# Patient Record
Sex: MALE | Race: OTHER | HISPANIC OR LATINO | Employment: UNEMPLOYED | URBAN - METROPOLITAN AREA
[De-identification: names, ages, dates, MRNs, and addresses within clinical notes are randomized per-mention and may not be internally consistent; named-entity substitution may affect disease eponyms.]

---

## 2022-02-28 ENCOUNTER — TELEPHONE (OUTPATIENT)
Dept: SCHEDULING | Facility: IMAGING CENTER | Age: 52
End: 2022-02-28

## 2022-03-01 ENCOUNTER — OFFICE VISIT (OUTPATIENT)
Dept: MEDICAL GROUP | Facility: IMAGING CENTER | Age: 52
End: 2022-03-01
Payer: COMMERCIAL

## 2022-03-01 VITALS
TEMPERATURE: 97.6 F | OXYGEN SATURATION: 96 % | SYSTOLIC BLOOD PRESSURE: 108 MMHG | WEIGHT: 309 LBS | HEART RATE: 104 BPM | RESPIRATION RATE: 12 BRPM | BODY MASS INDEX: 44.24 KG/M2 | HEIGHT: 70 IN | DIASTOLIC BLOOD PRESSURE: 78 MMHG

## 2022-03-01 DIAGNOSIS — I10 ESSENTIAL HYPERTENSION: ICD-10-CM

## 2022-03-01 DIAGNOSIS — F33.41 RECURRENT MAJOR DEPRESSIVE DISORDER, IN PARTIAL REMISSION (HCC): ICD-10-CM

## 2022-03-01 DIAGNOSIS — E78.00 PURE HYPERCHOLESTEROLEMIA: ICD-10-CM

## 2022-03-01 DIAGNOSIS — Z76.89 ENCOUNTER TO ESTABLISH CARE: ICD-10-CM

## 2022-03-01 DIAGNOSIS — E66.01 MORBID OBESITY (HCC): ICD-10-CM

## 2022-03-01 DIAGNOSIS — Z12.5 PROSTATE CANCER SCREENING: ICD-10-CM

## 2022-03-01 DIAGNOSIS — G25.81 RESTLESS LEGS SYNDROME: ICD-10-CM

## 2022-03-01 DIAGNOSIS — Z98.84 PERSONAL HX OF GASTRIC BYPASS: ICD-10-CM

## 2022-03-01 DIAGNOSIS — Z23 NEED FOR VACCINATION: ICD-10-CM

## 2022-03-01 DIAGNOSIS — K21.9 GASTROESOPHAGEAL REFLUX DISEASE WITHOUT ESOPHAGITIS: ICD-10-CM

## 2022-03-01 DIAGNOSIS — R73.02 IGT (IMPAIRED GLUCOSE TOLERANCE): ICD-10-CM

## 2022-03-01 DIAGNOSIS — L65.9 HAIR LOSS: ICD-10-CM

## 2022-03-01 PROBLEM — K75.81 STEATOHEPATITIS: Status: ACTIVE | Noted: 2021-05-17

## 2022-03-01 PROBLEM — K90.829 POSTRESECTIONAL MALABSORPTION SYNDROME: Status: ACTIVE | Noted: 2021-05-17

## 2022-03-01 PROBLEM — M54.50 LOW BACK PAIN: Status: ACTIVE | Noted: 2021-05-17

## 2022-03-01 PROBLEM — E78.5 HYPERLIPIDEMIA: Status: ACTIVE | Noted: 2021-05-17

## 2022-03-01 PROBLEM — F32.A DEPRESSION: Status: ACTIVE | Noted: 2021-05-17

## 2022-03-01 PROBLEM — M19.90 ARTHRITIS: Status: ACTIVE | Noted: 2021-05-17

## 2022-03-01 PROBLEM — Z86.010 HISTORY OF COLON POLYPS: Status: ACTIVE | Noted: 2021-10-11

## 2022-03-01 PROCEDURE — 99204 OFFICE O/P NEW MOD 45 MIN: CPT | Mod: 25 | Performed by: PHYSICIAN ASSISTANT

## 2022-03-01 PROCEDURE — 90471 IMMUNIZATION ADMIN: CPT | Performed by: PHYSICIAN ASSISTANT

## 2022-03-01 PROCEDURE — 90715 TDAP VACCINE 7 YRS/> IM: CPT | Performed by: PHYSICIAN ASSISTANT

## 2022-03-01 RX ORDER — PANTOPRAZOLE SODIUM 40 MG/1
40 TABLET, DELAYED RELEASE ORAL DAILY
COMMUNITY
Start: 2022-01-23 | End: 2022-07-08 | Stop reason: SDUPTHER

## 2022-03-01 RX ORDER — LAMOTRIGINE 25 MG/1
TABLET ORAL
Qty: 30 TABLET | Refills: 0 | Status: SHIPPED | OUTPATIENT
Start: 2022-03-01 | End: 2022-03-10

## 2022-03-01 RX ORDER — ARIPIPRAZOLE 2 MG/1
TABLET ORAL
COMMUNITY
Start: 2022-01-23 | End: 2022-03-01

## 2022-03-01 RX ORDER — PHENTERMINE HYDROCHLORIDE 37.5 MG/1
TABLET ORAL
COMMUNITY
Start: 2022-02-16 | End: 2022-10-04

## 2022-03-01 RX ORDER — ALPRAZOLAM 0.5 MG/1
0.5 TABLET ORAL PRN
COMMUNITY

## 2022-03-01 RX ORDER — OLMESARTAN MEDOXOMIL AND HYDROCHLOROTHIAZIDE 20/12.5 20; 12.5 MG/1; MG/1
1 TABLET ORAL
COMMUNITY
Start: 2021-12-10 | End: 2022-03-04

## 2022-03-01 RX ORDER — SIMVASTATIN 40 MG
TABLET ORAL
COMMUNITY
Start: 2022-01-04 | End: 2022-06-30 | Stop reason: SDUPTHER

## 2022-03-01 RX ORDER — FINASTERIDE 1 MG/1
1 TABLET, FILM COATED ORAL DAILY
Qty: 90 TABLET | Refills: 1 | Status: SHIPPED | OUTPATIENT
Start: 2022-03-01 | End: 2022-03-01

## 2022-03-01 ASSESSMENT — PAIN SCALES - GENERAL: PAINLEVEL: NO PAIN

## 2022-03-01 ASSESSMENT — ANXIETY QUESTIONNAIRES
1. FEELING NERVOUS, ANXIOUS, OR ON EDGE: SEVERAL DAYS
6. BECOMING EASILY ANNOYED OR IRRITABLE: SEVERAL DAYS
5. BEING SO RESTLESS THAT IT IS HARD TO SIT STILL: SEVERAL DAYS
2. NOT BEING ABLE TO STOP OR CONTROL WORRYING: NOT AT ALL
4. TROUBLE RELAXING: NOT AT ALL
7. FEELING AFRAID AS IF SOMETHING AWFUL MIGHT HAPPEN: NOT AT ALL
3. WORRYING TOO MUCH ABOUT DIFFERENT THINGS: NOT AT ALL
GAD7 TOTAL SCORE: 3

## 2022-03-01 ASSESSMENT — PATIENT HEALTH QUESTIONNAIRE - PHQ9
CLINICAL INTERPRETATION OF PHQ2 SCORE: 2
SUM OF ALL RESPONSES TO PHQ QUESTIONS 1-9: 13
5. POOR APPETITE OR OVEREATING: 3 - NEARLY EVERY DAY

## 2022-03-01 NOTE — ASSESSMENT & PLAN NOTE
Last A1c 5.8.  Patient states that he has been working on weight loss.  He is now on phentermine.  Tolerating well.

## 2022-03-01 NOTE — ASSESSMENT & PLAN NOTE
Patient admits to history of restless legs.  He states it  is intermittent and happens at night.  He takes gabapentin as needed.

## 2022-03-01 NOTE — ASSESSMENT & PLAN NOTE
Patient admits to hair loss on his scalp.  He is requesting a medication to help with hair growth.

## 2022-03-01 NOTE — ASSESSMENT & PLAN NOTE
Chronic, on sertraline 50 mg daily and Abilify 2 mg daily.  Patient wishes to try to go off of the Abilify due to concerns for weight gain.  He states he has been on that for several years.  He states he has been on other antidepressants in the past including Wellbutrin and Lexapro.  He states that he does have problems with impulsive behavior most notably related to eating and shopping.  His sister is an alcoholic.  No known diagnosis of bipolar disorder.

## 2022-03-01 NOTE — PROGRESS NOTES
Subjective:     CC:   Chief Complaint   Patient presents with   • Establish Care       HPI:   Chan presents today to discuss:    Depression  Chronic, on sertraline 50 mg daily and Abilify 2 mg daily.  Patient wishes to try to go off of the Abilify due to concerns for weight gain.  He states he has been on that for several years.  He states he has been on other antidepressants in the past including Wellbutrin and Lexapro.  He states that he does have problems with impulsive behavior most notably related to eating and shopping.  His sister is an alcoholic.  No known diagnosis of bipolar disorder.    Essential hypertension  Chronic, controlled on Benicar HCT.  Previous labs reviewed.    Gastroesophageal reflux disease  Chronic, controlled on Protonix 40 mg daily.  Last endoscopy in August 2021.    Hair loss  Patient admits to hair loss on his scalp.  He is requesting a medication to help with hair growth.    Hyperlipidemia  Chronic, controlled on simvastatin 40 mg daily.  Previous labs reviewed.    IGT (impaired glucose tolerance)  Last A1c 5.8.  Patient states that he has been working on weight loss.  He is now on phentermine.  Tolerating well.    Morbid obesity (HCC)  With history of gastric bypass.  Weight is being managed by Dr. Lock.  Currently on phentermine.    Restless legs syndrome  Patient admits to history of restless legs.  He states it  is intermittent and happens at night.  He takes gabapentin as needed.      History reviewed. No pertinent past medical history.  Social History     Tobacco Use   • Smoking status: Never Smoker   • Smokeless tobacco: Never Used   Vaping Use   • Vaping Use: Never used   Substance Use Topics   • Alcohol use: Yes     Comment: rare   • Drug use: Never     Current Outpatient Medications Ordered in Epic   Medication Sig Dispense Refill   • phentermine (ADIPEX-P) 37.5 MG tablet TAKE 1 TABLET (37.5 MG TOTAL) BY MOUTH DAILY BEFORE BREAKFAST. MAX DAILY AMOUNT: 37.5 MG     •  "simvastatin (ZOCOR) 40 MG Tab TAKE 1 TABLET ORAL DAILY FOR 90 DAYS.     • pantoprazole (PROTONIX) 40 MG Tablet Delayed Response Take 40 mg by mouth every day.     • olmesartan-hydrochlorothiazide (BENICAR HCT) 20-12.5 MG per tablet Take 1 Tablet by mouth every day.     • sertraline (ZOLOFT) 50 MG Tab TAKE 1 TABLET ORAL DAILY FOR 90 DAYS.     • Multiple Vitamin (MULTI-VITAMIN) Tab Take 1 Tablet by mouth every day.     • Calcium Citrate-Vitamin D 315-250 MG-UNIT Tab Take 1 Tablet by mouth 2 times a day.     • ALPRAZolam (XANAX) 0.5 MG Tab Take 0.5 mg by mouth as needed for Sleep. For anxiety     • GABAPENTIN PO Take 200 mg by mouth as needed. For restless leg syndrome     • lamoTRIgine (LAMICTAL) 25 MG Tab Take 1 tab daily x 1 week, then increase to 2 tabs daily 30 Tablet 0   • finasteride (PROPECIA) 1 MG tablet Take 1 Tablet by mouth every day. 90 Tablet 1     No current Epic-ordered facility-administered medications on file.     Patient has no known allergies.    Health Maintenance: Completed    ROS: see hpi  Gen: no fevers/chills  Pulm: no sob, no cough  CV: no chest pain, no palpitations, no edema  GI: no nausea/vomiting, no diarrhea  Skin: no rash    Objective:   Exam:  /78   Pulse (!) 104   Temp 36.4 °C (97.6 °F)   Resp 12   Ht 1.778 m (5' 10\")   Wt (!) 140 kg (309 lb)   SpO2 96%   BMI 44.34 kg/m²    Body mass index is 44.34 kg/m².    Gen: Alert and oriented, No apparent distress.  Thinning hair noted on the scalp.  HEENT: Head atraumatic, normocephalic. Pupils equal and round.  Neck: Neck is supple without lymphadenopathy.   Lungs: Normal effort, CTA bilaterally, no wheezes, rhonchi, or rales  CV: Regular rate and rhythm. No murmurs, rubs, or gallops.  Ext: No clubbing, cyanosis, edema.    Assessment & Plan:     51 y.o. male with the following -     1. Encounter to establish care  Pleasant 51-year-old male here to establish care.  History reviewed.  Previous records reviewed with patient.  " Endoscopy up-to-date.  Due for PSA.    2. Recurrent major depressive disorder, in partial remission (HCC)  Chronic, uncontrolled.  Will wean off Abilify.  Take every other day for a week then stop.  We will have patient start Lamictal 25 mg daily for this first week and then increase to 2 tablets daily.  Recheck in 3 weeks.  Continue Zoloft at this time.   - lamoTRIgine (LAMICTAL) 25 MG Tab; Take 1 tab daily x 1 week, then increase to 2 tabs daily  Dispense: 30 Tablet; Refill: 0    3. IGT (impaired glucose tolerance)  Please continue working on lifestyle modifications. This includes accumulation of 150 minutes or more of moderate-intensity activity each week as tolerated and a healthy diet that is low in saturated fat, sodium, and cholesterol, such as the mediterranean diet that is typically high in fruits, vegetables, whole grains, beans, nuts, and seeds, includes olive oil as an important source of monounsaturated fat, DASH diet, and/or also consider a plant-based diet.    - HEMOGLOBIN A1C; Future    4. Essential hypertension  Chronic, controlled.  Continue current regimen.  - Comp Metabolic Panel; Future  - URIC ACID; Future    5. Hair loss  - finasteride (PROPECIA) 1 MG tablet; Take 1 Tablet by mouth every day.  Dispense: 90 Tablet; Refill: 1    6. Restless legs syndrome  - Comp Metabolic Panel; Future  - URIC ACID; Future    7. Personal hx of gastric bypass    8. Gastroesophageal reflux disease without esophagitis  Chronic, controlled on Protonix    9. Pure hypercholesterolemia  Chronic, controlled on simvastatin    10. Morbid obesity (HCC)  Status post gastric bypass.  Managed by weight specialist.    11. Prostate cancer screening  - PROSTATE SPECIFIC AG SCREENING; Future    12. Need for vaccination  - Tdap Vaccine =>8YO IM    Return in about 3 weeks (around 3/22/2022) for Annual physical, lab review.    Roro Munson PA-C ([facility])  Physician Assistant Certified  [facility]    Please note that this  dictation was created using voice recognition software. I have made every reasonable attempt to correct obvious errors, but I expect that there are errors of grammar and possibly content that I did not discover before finalizing the note.

## 2022-03-04 ENCOUNTER — HOSPITAL ENCOUNTER (OUTPATIENT)
Dept: LAB | Facility: MEDICAL CENTER | Age: 52
End: 2022-03-04
Attending: PHYSICIAN ASSISTANT
Payer: COMMERCIAL

## 2022-03-04 DIAGNOSIS — R73.02 IGT (IMPAIRED GLUCOSE TOLERANCE): ICD-10-CM

## 2022-03-04 DIAGNOSIS — I10 ESSENTIAL HYPERTENSION: ICD-10-CM

## 2022-03-04 DIAGNOSIS — G25.81 RESTLESS LEGS SYNDROME: ICD-10-CM

## 2022-03-04 DIAGNOSIS — E79.0 ELEVATED URIC ACID IN BLOOD: ICD-10-CM

## 2022-03-04 DIAGNOSIS — Z12.5 PROSTATE CANCER SCREENING: ICD-10-CM

## 2022-03-04 LAB
ALBUMIN SERPL BCP-MCNC: 4.3 G/DL (ref 3.2–4.9)
ALBUMIN/GLOB SERPL: 1.5 G/DL
ALP SERPL-CCNC: 84 U/L (ref 30–99)
ALT SERPL-CCNC: 43 U/L (ref 2–50)
ANION GAP SERPL CALC-SCNC: 13 MMOL/L (ref 7–16)
AST SERPL-CCNC: 34 U/L (ref 12–45)
BILIRUB SERPL-MCNC: 0.4 MG/DL (ref 0.1–1.5)
BUN SERPL-MCNC: 16 MG/DL (ref 8–22)
CALCIUM SERPL-MCNC: 10 MG/DL (ref 8.5–10.5)
CHLORIDE SERPL-SCNC: 101 MMOL/L (ref 96–112)
CO2 SERPL-SCNC: 23 MMOL/L (ref 20–33)
CREAT SERPL-MCNC: 0.95 MG/DL (ref 0.5–1.4)
EST. AVERAGE GLUCOSE BLD GHB EST-MCNC: 126 MG/DL
FASTING STATUS PATIENT QL REPORTED: NORMAL
GLOBULIN SER CALC-MCNC: 2.8 G/DL (ref 1.9–3.5)
GLUCOSE SERPL-MCNC: 102 MG/DL (ref 65–99)
HBA1C MFR BLD: 6 % (ref 4–5.6)
POTASSIUM SERPL-SCNC: 4 MMOL/L (ref 3.6–5.5)
PROT SERPL-MCNC: 7.1 G/DL (ref 6–8.2)
PSA SERPL-MCNC: 0.38 NG/ML (ref 0–4)
SODIUM SERPL-SCNC: 137 MMOL/L (ref 135–145)
URATE SERPL-MCNC: 7.5 MG/DL (ref 2.5–8.3)

## 2022-03-04 PROCEDURE — 80053 COMPREHEN METABOLIC PANEL: CPT

## 2022-03-04 PROCEDURE — 84550 ASSAY OF BLOOD/URIC ACID: CPT

## 2022-03-04 PROCEDURE — 36415 COLL VENOUS BLD VENIPUNCTURE: CPT

## 2022-03-04 PROCEDURE — 84153 ASSAY OF PSA TOTAL: CPT

## 2022-03-04 PROCEDURE — 83036 HEMOGLOBIN GLYCOSYLATED A1C: CPT

## 2022-03-04 RX ORDER — OLMESARTAN MEDOXOMIL 40 MG/1
40 TABLET ORAL DAILY
Qty: 30 TABLET | Refills: 0 | Status: SHIPPED | OUTPATIENT
Start: 2022-03-04 | End: 2022-03-28

## 2022-03-08 DIAGNOSIS — F33.41 RECURRENT MAJOR DEPRESSIVE DISORDER, IN PARTIAL REMISSION (HCC): ICD-10-CM

## 2022-03-10 RX ORDER — LAMOTRIGINE 25 MG/1
TABLET ORAL
Qty: 180 TABLET | Refills: 1 | Status: SHIPPED | OUTPATIENT
Start: 2022-03-10 | End: 2022-03-18

## 2022-03-18 ENCOUNTER — OFFICE VISIT (OUTPATIENT)
Dept: MEDICAL GROUP | Facility: IMAGING CENTER | Age: 52
End: 2022-03-18
Payer: COMMERCIAL

## 2022-03-18 VITALS
DIASTOLIC BLOOD PRESSURE: 66 MMHG | SYSTOLIC BLOOD PRESSURE: 106 MMHG | OXYGEN SATURATION: 97 % | BODY MASS INDEX: 43.52 KG/M2 | HEIGHT: 70 IN | TEMPERATURE: 97.4 F | HEART RATE: 101 BPM | WEIGHT: 304 LBS

## 2022-03-18 DIAGNOSIS — F33.41 RECURRENT MAJOR DEPRESSIVE DISORDER, IN PARTIAL REMISSION (HCC): ICD-10-CM

## 2022-03-18 DIAGNOSIS — E79.0 ELEVATED URIC ACID IN BLOOD: ICD-10-CM

## 2022-03-18 DIAGNOSIS — Z00.00 WELLNESS EXAMINATION: ICD-10-CM

## 2022-03-18 DIAGNOSIS — L65.9 HAIR LOSS: ICD-10-CM

## 2022-03-18 DIAGNOSIS — R73.02 IGT (IMPAIRED GLUCOSE TOLERANCE): ICD-10-CM

## 2022-03-18 DIAGNOSIS — E66.01 MORBID OBESITY (HCC): ICD-10-CM

## 2022-03-18 PROCEDURE — 99396 PREV VISIT EST AGE 40-64: CPT | Performed by: PHYSICIAN ASSISTANT

## 2022-03-18 RX ORDER — LAMOTRIGINE 25 MG/1
50 TABLET ORAL DAILY
Qty: 180 TABLET | Refills: 1
Start: 2022-03-18 | End: 2022-06-20 | Stop reason: SDUPTHER

## 2022-03-18 ASSESSMENT — ANXIETY QUESTIONNAIRES
6. BECOMING EASILY ANNOYED OR IRRITABLE: NOT AT ALL
5. BEING SO RESTLESS THAT IT IS HARD TO SIT STILL: NOT AT ALL
7. FEELING AFRAID AS IF SOMETHING AWFUL MIGHT HAPPEN: NOT AT ALL
3. WORRYING TOO MUCH ABOUT DIFFERENT THINGS: NOT AT ALL
1. FEELING NERVOUS, ANXIOUS, OR ON EDGE: NOT AT ALL
2. NOT BEING ABLE TO STOP OR CONTROL WORRYING: NOT AT ALL
4. TROUBLE RELAXING: NOT AT ALL
GAD7 TOTAL SCORE: 0

## 2022-03-18 ASSESSMENT — PAIN SCALES - GENERAL: PAINLEVEL: NO PAIN

## 2022-03-18 ASSESSMENT — PATIENT HEALTH QUESTIONNAIRE - PHQ9: CLINICAL INTERPRETATION OF PHQ2 SCORE: 0

## 2022-03-18 NOTE — PROGRESS NOTES
Subjective:     CC:   Chief Complaint   Patient presents with   • Annual Exam     With lab review       HPI:   Chan presents today to discuss:    Elevated uric acid in blood  Patient's labs show uric acid level 7.5.  Hydrochlorothiazide was removed and olmesartan was increased to 40 mg daily.  Tolerating olmesartan.  Patient denies any history of joint pain.  No history of gout flares.  Rarely drinks beer.  Rarely eats shellfish.    Depression  Patient states that he has noticed significant improvement since switching to Lamictal 50 mg daily.  He is now off Abilify.  He is still on Zoloft.  He feels like his anxiety and depression are very well controlled.    IGT (impaired glucose tolerance)  Patient's labs show slight increase of his A1c.  He is currently being managed by a weight specialist and is on phentermine.  He has lost 5 pounds since his last visit.    Morbid obesity (HCC)  Chronic, patient working on weight loss.    Hair loss  Patient states that since starting Propecia he has noticed new hair growth on his scalp.  He is very excited about his result so far.  He wishes to continue this medication.  No side effects.    Depression Screening    Little interest or pleasure in doing things?  0 - not at all  Feeling down, depressed , or hopeless? 0 - not at all  Patient Health Questionnaire Score: 0    CRISTA-7 Questionnaire    Feeling nervous, anxious, or on edge: Not at all  Not being able to sop or control worrying: Not at all  Worrying too much about different things: Not at all  Trouble relaxing: Not at all  Being so restless that it's hard to sit still: Not at all  Becoming easily annoyed or irritable: Not at all  Feeling afraid as if something awful might happen: Not at all  Total: 0    History reviewed. No pertinent past medical history.  Family History   Problem Relation Age of Onset   • Lupus Mother    • Kidney Disease Mother    • Stroke Mother    • Kidney Disease Father    • Hypertension Father    •  Lupus Sister    • Cancer Paternal Grandfather         liver cancer   • Lupus Sister    • Diabetes Neg Hx      Past Surgical History:   Procedure Laterality Date   • GASTRIC BYPASS LAPAROSCOPIC      2006     Social History     Tobacco Use   • Smoking status: Never Smoker   • Smokeless tobacco: Never Used   Vaping Use   • Vaping Use: Never used   Substance Use Topics   • Alcohol use: Yes     Comment: rare   • Drug use: Never     Social History     Social History Narrative    From [place]    Stay at home    2 cats and 1 dog    Has several craft hobbies    Several tattoos    No blood transfusions    No  work     Current Outpatient Medications Ordered in Epic   Medication Sig Dispense Refill   • lamoTRIgine (LAMICTAL) 25 MG Tab Take 2 Tablets by mouth every day. 180 Tablet 1   • olmesartan (BENICAR) 40 MG Tab Take 1 Tablet by mouth every day. 30 Tablet 0   • phentermine (ADIPEX-P) 37.5 MG tablet TAKE 1 TABLET (37.5 MG TOTAL) BY MOUTH DAILY BEFORE BREAKFAST. MAX DAILY AMOUNT: 37.5 MG     • simvastatin (ZOCOR) 40 MG Tab TAKE 1 TABLET ORAL DAILY FOR 90 DAYS.     • pantoprazole (PROTONIX) 40 MG Tablet Delayed Response Take 40 mg by mouth every day.     • sertraline (ZOLOFT) 50 MG Tab TAKE 1 TABLET ORAL DAILY FOR 90 DAYS.     • Multiple Vitamin (MULTI-VITAMIN) Tab Take 1 Tablet by mouth every day.     • Calcium Citrate-Vitamin D 315-250 MG-UNIT Tab Take 1 Tablet by mouth 2 times a day.     • ALPRAZolam (XANAX) 0.5 MG Tab Take 0.5 mg by mouth as needed for Sleep. For anxiety     • GABAPENTIN PO Take 200 mg by mouth as needed. For restless leg syndrome     • finasteride (PROPECIA) 1 MG tablet TAKE 1 TABLET BY MOUTH EVERY DAY 90 Tablet 1     No current Epic-ordered facility-administered medications on file.     Patient has no known allergies.    Health Maintenance: Completed    ROS: see hpi  Gen: no fevers/chills  Pulm: no sob, no cough  CV: no chest pain, no palpitations, no edema  GI: no nausea/vomiting, no  "diarrhea  Skin: no rash    Objective:   Exam:  /66 (BP Location: Left arm, Patient Position: Sitting, BP Cuff Size: Adult)   Pulse (!) 101   Temp 36.3 °C (97.4 °F) (Temporal)   Ht 1.778 m (5' 10\")   Wt (!) 138 kg (304 lb)   SpO2 97%   BMI 43.62 kg/m²    Body mass index is 43.62 kg/m².    Gen: Alert and oriented, No apparent distress.  HEENT: Head atraumatic, normocephalic. Pupils equal and round.  Bilateral TMs pearly gray without erythema or bulge.  Neck: Neck is supple without lymphadenopathy.   Lungs: Normal effort, CTA bilaterally, no wheezes, rhonchi, or rales  CV: Regular rate and rhythm. No murmurs, rubs, or gallops.  ABD: +BS. Non-tender, non-distended. No rebound, rigidity, or guarding.  Ext: No clubbing, cyanosis, edema.  Neuro: CN II-XII intact, strength 5/5 in all muscle groups, sensation intact bilaterally to light touch, coordination intact bilaterally, Romberg negative, pronator drift negative.    Assessment & Plan:     51 y.o. male with the following -     1. Wellness examination  Pleasant 51-year-old male here for annual physical.  History reviewed.  Up-to-date on vaccinations.  Previous labs and records reviewed with patient.  Medications reviewed.  Up-to-date on cancer screening.  PSA within normal limits.    2. Recurrent major depressive disorder, in partial remission (HCC)  Chronic, improved.  Continue lamotrigine and Zoloft.  Recheck in 3 months.  - lamoTRIgine (LAMICTAL) 25 MG Tab; Take 2 Tablets by mouth every day.  Dispense: 180 Tablet; Refill: 1    3. IGT (impaired glucose tolerance)  Chronic, discussed dietary changes.  Continue phentermine.    - HEMOGLOBIN A1C; Future    4. Elevated uric acid in blood  Likely related to hydrochlorothiazide.  Discussed high purine foods to avoid.  Recheck uric acid in 3 months.  Advised patient to monitor for signs of gout as uric acid crystal deposition in the joints happens when above 6.8.  Patient to follow-up immediately if he develops any " signs of joint pain, swelling, redness especially in the great toes, ankles, knees, elbows.  - URIC ACID; Future    5. Morbid obesity (HCC)  Please continue working on lifestyle modifications. This includes accumulation of 150 minutes or more of moderate-intensity activity each week as tolerated and a healthy diet that is low in saturated fat, sodium, and cholesterol, such as the mediterranean diet that is typically high in fruits, vegetables, whole grains, beans, nuts, and seeds, includes olive oil as an important source of monounsaturated fat, DASH diet, and/or also consider a plant-based diet.    6. Hair loss  Improved on Propecia.  Continue current dose.    Return in about 3 months (around 6/18/2022) for Follow-up.    Roro Munson PA-C ([facility])  Physician Assistant Certified  [facility]    Please note that this dictation was created using voice recognition software. I have made every reasonable attempt to correct obvious errors, but I expect that there are errors of grammar and possibly content that I did not discover before finalizing the note.

## 2022-03-18 NOTE — PATIENT INSTRUCTIONS
Low-Purine Eating Plan  A low-purine eating plan involves making food choices to limit your intake of purine. Purine is a kind of uric acid. Too much uric acid in your blood can cause certain conditions, such as gout and kidney stones. Eating a low-purine diet can help control these conditions.  What are tips for following this plan?  Reading food labels    · Avoid foods with saturated or Trans fat.  · Check the ingredient list of grains-based foods, such as bread and cereal, to make sure that they contain whole grains.  · Check the ingredient list of sauces or soups to make sure they do not contain meat or fish.  · When choosing soft drinks, check the ingredient list to make sure they do not contain high-fructose corn syrup.  Shopping  · Buy plenty of fresh fruits and vegetables.  · Avoid buying canned or fresh fish.  · Buy dairy products labeled as low-fat or nonfat.  · Avoid buying premade or processed foods. These foods are often high in fat, salt (sodium), and added sugar.  Cooking  · Use olive oil instead of butter when cooking. Oils like olive oil, canola oil, and sunflower oil contain healthy fats.  Meal planning  · Learn which foods do or do not affect you. If you find out that a food tends to cause your gout symptoms to flare up, avoid eating that food. You can enjoy foods that do not cause problems. If you have any questions about a food item, talk with your dietitian or health care provider.  · Limit foods high in fat, especially saturated fat. Fat makes it harder for your body to get rid of uric acid.  · Choose foods that are lower in fat and are lean sources of protein.  General guidelines  · Limit alcohol intake to no more than 1 drink a day for nonpregnant women and 2 drinks a day for men. One drink equals 12 oz of beer, 5 oz of wine, or 1½ oz of hard liquor. Alcohol can affect the way your body gets rid of uric acid.  · Drink plenty of water to keep your urine clear or pale yellow. Fluids can help  remove uric acid from your body.  · If directed by your health care provider, take a vitamin C supplement.  · Work with your health care provider and dietitian to develop a plan to achieve or maintain a healthy weight. Losing weight can help reduce uric acid in your blood.  What foods are recommended?  The items listed may not be a complete list. Talk with your dietitian about what dietary choices are best for you.  Foods low in purines  Foods low in purines do not need to be limited. These include:  · All fruits.  · All low-purine vegetables, pickles, and olives.  · Breads, pasta, rice, cornbread, and popcorn. Cake and other baked goods.  · All dairy foods.  · Eggs, nuts, and nut butters.  · Spices and condiments, such as salt, herbs, and vinegar.  · Plant oils, butter, and margarine.  · Water, sugar-free soft drinks, tea, coffee, and cocoa.  · Vegetable-based soups, broths, sauces, and gravies.  Foods moderate in purines  Foods moderate in purines should be limited to the amounts listed.  · ½ cup of asparagus, cauliflower, spinach, mushrooms, or green peas, each day.  · 2/3 cup uncooked oatmeal, each day.  · ¼ cup dry wheat bran or wheat germ, each day.  · 2-3 ounces of meat or poultry, each day.  · 4-6 ounces of shellfish, such as crab, lobster, oysters, or shrimp, each day.  · 1 cup cooked beans, peas, or lentils, each day.  · Soup, broths, or bouillon made from meat or fish. Limit these foods as much as possible.  What foods are not recommended?  The items listed may not be a complete list. Talk with your dietitian about what dietary choices are best for you.  Limit your intake of foods high in purines, including:  · Beer and other alcohol.  · Meat-based gravy or sauce.  · Canned or fresh fish, such as:  ? Anchovies, sardines, herring, and tuna.  ? Mussels and scallops.  ? Codfish, trout, and chanel.  · Sherman.  · Organ meats, such as:  ? Liver or kidney.  ? Tripe.  ? Sweetbreads (thymus gland or  pancreas).  · Wild game or goose.  · Yeast or yeast extract supplements.  · Drinks sweetened with high-fructose corn syrup.  Summary  · Eating a low-purine diet can help control conditions caused by too much uric acid in the body, such as gout or kidney stones.  · Choose low-purine foods, limit alcohol, and limit foods high in fat.  · You will learn over time which foods do or do not affect you. If you find out that a food tends to cause your gout symptoms to flare up, avoid eating that food.  This information is not intended to replace advice given to you by your health care provider. Make sure you discuss any questions you have with your health care provider.  Document Released: 04/13/2012 Document Revised: 11/30/2018 Document Reviewed: 01/31/2018  Elsevier Patient Education © 2020 [employer]

## 2022-03-18 NOTE — ASSESSMENT & PLAN NOTE
Patient's labs show uric acid level 7.5.  Hydrochlorothiazide was removed and olmesartan was increased to 40 mg daily.  Tolerating olmesartan.  Patient denies any history of joint pain.  No history of gout flares.  Rarely drinks beer.  Rarely eats shellfish.

## 2022-03-18 NOTE — ASSESSMENT & PLAN NOTE
Patient states that since starting Propecia he has noticed new hair growth on his scalp.  He is very excited about his result so far.  He wishes to continue this medication.  No side effects.

## 2022-03-18 NOTE — ASSESSMENT & PLAN NOTE
Patient's labs show slight increase of his A1c.  He is currently being managed by a weight specialist and is on phentermine.  He has lost 5 pounds since his last visit.

## 2022-03-18 NOTE — ASSESSMENT & PLAN NOTE
Patient states that he has noticed significant improvement since switching to Lamictal 50 mg daily.  He is now off Abilify.  He is still on Zoloft.  He feels like his anxiety and depression are very well controlled.

## 2022-06-07 DIAGNOSIS — K11.7 XEROSTOMIA: ICD-10-CM

## 2022-06-07 DIAGNOSIS — R73.02 IGT (IMPAIRED GLUCOSE TOLERANCE): ICD-10-CM

## 2022-06-07 DIAGNOSIS — E79.0 ELEVATED URIC ACID IN BLOOD: ICD-10-CM

## 2022-06-08 ENCOUNTER — HOSPITAL ENCOUNTER (OUTPATIENT)
Dept: LAB | Facility: MEDICAL CENTER | Age: 52
End: 2022-06-08
Attending: PHYSICIAN ASSISTANT
Payer: COMMERCIAL

## 2022-06-08 DIAGNOSIS — K11.7 XEROSTOMIA: ICD-10-CM

## 2022-06-08 DIAGNOSIS — R73.02 IGT (IMPAIRED GLUCOSE TOLERANCE): ICD-10-CM

## 2022-06-08 DIAGNOSIS — E79.0 ELEVATED URIC ACID IN BLOOD: ICD-10-CM

## 2022-06-08 LAB
EST. AVERAGE GLUCOSE BLD GHB EST-MCNC: 117 MG/DL
HBA1C MFR BLD: 5.7 % (ref 4–5.6)
URATE SERPL-MCNC: 6.8 MG/DL (ref 2.5–8.3)

## 2022-06-08 PROCEDURE — 86038 ANTINUCLEAR ANTIBODIES: CPT

## 2022-06-08 PROCEDURE — 83036 HEMOGLOBIN GLYCOSYLATED A1C: CPT

## 2022-06-08 PROCEDURE — 36415 COLL VENOUS BLD VENIPUNCTURE: CPT

## 2022-06-08 PROCEDURE — 84550 ASSAY OF BLOOD/URIC ACID: CPT

## 2022-06-08 PROCEDURE — 86235 NUCLEAR ANTIGEN ANTIBODY: CPT

## 2022-06-10 LAB
ENA SS-B IGG SER IA-ACNC: 0 AU/ML (ref 0–40)
NUCLEAR IGG SER QL IA: NORMAL
SSA52 R0ENA AB IGG Q0420: 0 AU/ML (ref 0–40)
SSA60 R0ENA AB IGG Q0419: 1 AU/ML (ref 0–40)

## 2022-06-13 ENCOUNTER — OFFICE VISIT (OUTPATIENT)
Dept: MEDICAL GROUP | Facility: IMAGING CENTER | Age: 52
End: 2022-06-13
Payer: COMMERCIAL

## 2022-06-13 VITALS
OXYGEN SATURATION: 97 % | TEMPERATURE: 98.6 F | SYSTOLIC BLOOD PRESSURE: 128 MMHG | HEART RATE: 98 BPM | BODY MASS INDEX: 42.8 KG/M2 | DIASTOLIC BLOOD PRESSURE: 72 MMHG | WEIGHT: 299 LBS | HEIGHT: 70 IN

## 2022-06-13 DIAGNOSIS — E79.0 ELEVATED URIC ACID IN BLOOD: ICD-10-CM

## 2022-06-13 DIAGNOSIS — K11.7 XEROSTOMIA: ICD-10-CM

## 2022-06-13 PROCEDURE — 99214 OFFICE O/P EST MOD 30 MIN: CPT | Performed by: PHYSICIAN ASSISTANT

## 2022-06-13 ASSESSMENT — PAIN SCALES - GENERAL: PAINLEVEL: NO PAIN

## 2022-06-13 NOTE — ASSESSMENT & PLAN NOTE
Patient's labs show improvement in uric acid since being off hydrochlorothiazide.  He went from 7.5 down to 6.8.  He denies any joint pain.  He has been trying to follow a low purine diet.

## 2022-06-13 NOTE — ASSESSMENT & PLAN NOTE
Patient admits to severe dry mouth over the past month.  He also notes some mild dryness in his nasal passages and his eyes.  He does have a family history of Sjogren's and preappointment labs are ordered.  He is negative for SSA and SSB as well as negative for JENNIFER.  Patient denies any medication changes, no new supplements.  He has been trying over-the-counter Biotene lozenges and mouth rinse without improvement.  He does have a lab order from his bariatric surgeon for updated vitamin levels and anemia screen.  Denies any difficulty swallowing, allergies, URI, mouth sores.

## 2022-06-13 NOTE — PROGRESS NOTES
Subjective:     CC:   Chief Complaint   Patient presents with   • Dry Mouth     Constant swallowing/gulping    • Requesting Labs     Family hx   • Dry Eye       HPI:   Chan presents today to discuss:    Xerostomia  Patient admits to severe dry mouth over the past month.  He also notes some mild dryness in his nasal passages and his eyes.  He does have a family history of Sjogren's and preappointment labs are ordered.  He is negative for SSA and SSB as well as negative for JENNIFER.  Patient denies any medication changes, no new supplements.  He has been trying over-the-counter Biotene lozenges and mouth rinse without improvement.  He does have a lab order from his bariatric surgeon for updated vitamin levels and anemia screen.  Denies any difficulty swallowing, allergies, URI, mouth sores.    Elevated uric acid in blood  Patient's labs show improvement in uric acid since being off hydrochlorothiazide.  He went from 7.5 down to 6.8.  He denies any joint pain.  He has been trying to follow a low purine diet.      History reviewed. No pertinent past medical history.  Family History   Problem Relation Age of Onset   • Lupus Mother    • Kidney Disease Mother    • Stroke Mother    • Kidney Disease Father    • Hypertension Father    • Lupus Sister    • Cancer Paternal Grandfather         liver cancer   • Lupus Sister    • Diabetes Neg Hx      Past Surgical History:   Procedure Laterality Date   • GASTRIC BYPASS LAPAROSCOPIC      2006     Social History     Tobacco Use   • Smoking status: Never Smoker   • Smokeless tobacco: Never Used   Vaping Use   • Vaping Use: Never used   Substance Use Topics   • Alcohol use: Yes     Comment: rare   • Drug use: Never     Social History     Social History Narrative    From [place]    Stay at home    2 cats and 1 dog    Has several craft hobbies    Several tattoos    No blood transfusions    No  work     Current Outpatient Medications Ordered in Epic   Medication Sig Dispense Refill  "  • olmesartan (BENICAR) 40 MG Tab TAKE 1 TABLET BY MOUTH EVERY DAY 90 Tablet 1   • lamoTRIgine (LAMICTAL) 25 MG Tab Take 2 Tablets by mouth every day. 180 Tablet 1   • phentermine (ADIPEX-P) 37.5 MG tablet TAKE 1 TABLET (37.5 MG TOTAL) BY MOUTH DAILY BEFORE BREAKFAST. MAX DAILY AMOUNT: 37.5 MG     • simvastatin (ZOCOR) 40 MG Tab TAKE 1 TABLET ORAL DAILY FOR 90 DAYS.     • pantoprazole (PROTONIX) 40 MG Tablet Delayed Response Take 40 mg by mouth every day.     • sertraline (ZOLOFT) 50 MG Tab TAKE 1 TABLET ORAL DAILY FOR 90 DAYS.     • Multiple Vitamin (MULTI-VITAMIN) Tab Take 1 Tablet by mouth every day.     • Calcium Citrate-Vitamin D 315-250 MG-UNIT Tab Take 1 Tablet by mouth 2 times a day.     • ALPRAZolam (XANAX) 0.5 MG Tab Take 0.5 mg by mouth as needed for Sleep. For anxiety     • GABAPENTIN PO Take 200 mg by mouth as needed. For restless leg syndrome     • finasteride (PROPECIA) 1 MG tablet TAKE 1 TABLET BY MOUTH EVERY DAY 90 Tablet 1     No current Epic-ordered facility-administered medications on file.     Patient has no known allergies.      ROS: see hpi  Gen: no fevers/chills  Pulm: no sob, no cough  CV: no chest pain, no palpitations, no edema  GI: no nausea/vomiting, no diarrhea  Skin: no rash    Objective:   Exam:  /72 (BP Location: Left arm, Patient Position: Sitting, BP Cuff Size: Adult)   Pulse 98   Temp 37 °C (98.6 °F) (Temporal)   Ht 1.778 m (5' 10\")   Wt (!) 136 kg (299 lb)   SpO2 97%   BMI 42.90 kg/m²    Body mass index is 42.9 kg/m².    Gen: Alert and oriented, No apparent distress.  HEENT: Head atraumatic, normocephalic. Pupils equal and round.   Dryness noted along dorsal tongue.  Ventral tongue without lesions or glossitis.  No oral lesions.  No postnasal drip.  Bilateral turbinates within normal limits.  No salivary gland tenderness or swelling.    Neck: Neck is supple without lymphadenopathy.  Lungs: Normal effort, CTA bilaterally, no wheezes, rhonchi, or rales  CV: Regular " rate and rhythm. No murmurs, rubs, or gallops.  Ext: No clubbing, cyanosis, edema.    Assessment & Plan:     52 y.o. male with the following -     1. Xerostomia  Patient with dry mouth for the past month, negative Sjogren's and autoimmune testing.  Await lab draw from bariatric surgeon, rule out vitamin deficiency.  ENT evaluation if persists.  Patient to trial lemon drops to promote salivation.  - Referral to ENT    2. Elevated uric acid in blood  We will recheck uric acid level in 3 months, continue low purine diet.  If remains above 6.8 will start allopurinol 100 mg daily.  - URIC ACID; Future      Return in about 3 months (around 9/13/2022) for Follow-up.    Roro Munson PA-C (Baker)  Physician Assistant Certified  [facility]    Please note that this dictation was created using voice recognition software. I have made every reasonable attempt to correct obvious errors, but I expect that there are errors of grammar and possibly content that I did not discover before finalizing the note.

## 2022-06-18 ENCOUNTER — PATIENT MESSAGE (OUTPATIENT)
Dept: MEDICAL GROUP | Facility: IMAGING CENTER | Age: 52
End: 2022-06-18
Payer: COMMERCIAL

## 2022-06-18 DIAGNOSIS — F33.41 RECURRENT MAJOR DEPRESSIVE DISORDER, IN PARTIAL REMISSION (HCC): ICD-10-CM

## 2022-06-21 RX ORDER — LAMOTRIGINE 25 MG/1
50 TABLET ORAL DAILY
Qty: 180 TABLET | Refills: 1 | Status: SHIPPED | OUTPATIENT
Start: 2022-06-21 | End: 2022-10-18

## 2022-06-30 ENCOUNTER — PATIENT MESSAGE (OUTPATIENT)
Dept: MEDICAL GROUP | Facility: IMAGING CENTER | Age: 52
End: 2022-06-30
Payer: COMMERCIAL

## 2022-06-30 DIAGNOSIS — E78.00 PURE HYPERCHOLESTEROLEMIA: ICD-10-CM

## 2022-06-30 RX ORDER — SIMVASTATIN 40 MG
40 TABLET ORAL NIGHTLY
Qty: 90 TABLET | Refills: 3 | Status: SHIPPED | OUTPATIENT
Start: 2022-06-30 | End: 2023-05-25 | Stop reason: SDUPTHER

## 2022-07-08 ENCOUNTER — OFFICE VISIT (OUTPATIENT)
Dept: MEDICAL GROUP | Facility: IMAGING CENTER | Age: 52
End: 2022-07-08
Payer: COMMERCIAL

## 2022-07-08 VITALS
BODY MASS INDEX: 41.52 KG/M2 | DIASTOLIC BLOOD PRESSURE: 74 MMHG | HEART RATE: 98 BPM | HEIGHT: 70 IN | WEIGHT: 290 LBS | SYSTOLIC BLOOD PRESSURE: 104 MMHG

## 2022-07-08 DIAGNOSIS — F33.42 RECURRENT MAJOR DEPRESSIVE DISORDER, IN FULL REMISSION (HCC): ICD-10-CM

## 2022-07-08 DIAGNOSIS — K21.9 GASTROESOPHAGEAL REFLUX DISEASE WITHOUT ESOPHAGITIS: ICD-10-CM

## 2022-07-08 DIAGNOSIS — G25.81 RESTLESS LEGS SYNDROME: ICD-10-CM

## 2022-07-08 DIAGNOSIS — K11.7 XEROSTOMIA: ICD-10-CM

## 2022-07-08 PROCEDURE — 99214 OFFICE O/P EST MOD 30 MIN: CPT | Performed by: PHYSICIAN ASSISTANT

## 2022-07-08 RX ORDER — PANTOPRAZOLE SODIUM 40 MG/1
40 TABLET, DELAYED RELEASE ORAL DAILY
Qty: 90 TABLET | Refills: 3 | Status: SHIPPED | OUTPATIENT
Start: 2022-07-08 | End: 2023-05-25 | Stop reason: SDUPTHER

## 2022-07-08 RX ORDER — ARIPIPRAZOLE 2 MG/1
2 TABLET ORAL DAILY
Qty: 90 TABLET | Refills: 3 | Status: SHIPPED | OUTPATIENT
Start: 2022-07-08 | End: 2023-05-25 | Stop reason: SDUPTHER

## 2022-07-08 RX ORDER — GABAPENTIN 100 MG/1
100 CAPSULE ORAL NIGHTLY PRN
Qty: 90 CAPSULE | Refills: 3 | Status: SHIPPED | OUTPATIENT
Start: 2022-07-08 | End: 2023-05-25 | Stop reason: SDUPTHER

## 2022-07-08 NOTE — ASSESSMENT & PLAN NOTE
Patient continues to complain of dry mouth, he has an appointment for lab work per his bariatric surgeon within the next few weeks to check vitamin levels.  He has been sucking on lemon drops with mild improvement.  No tongue swelling.

## 2022-07-08 NOTE — ASSESSMENT & PLAN NOTE
Patient admits to history of restless leg syndrome, he does take gabapentin nightly as needed.  He states he does not take it every night.

## 2022-07-08 NOTE — PROGRESS NOTES
Subjective:     CC:   Chief Complaint   Patient presents with   • Follow-Up     Med refills       HPI:   Chan presents today to discuss:    Depression  Chronic, controlled on lamotrigine 50 mg daily, Abilify 2 mg daily, sertraline 50 mg daily.  He states this regimen is working very well for him.  He does need a refill of Abilify.    Gastroesophageal reflux disease  Chronic, controlled on pantoprazole 40 mg daily.  Requesting refill today.    Xerostomia  Patient continues to complain of dry mouth, he has an appointment for lab work per his bariatric surgeon within the next few weeks to check vitamin levels.  He has been sucking on lemon drops with mild improvement.  No tongue swelling.    Restless legs syndrome  Patient admits to history of restless leg syndrome, he does take gabapentin nightly as needed.  He states he does not take it every night.      History reviewed. No pertinent past medical history.  Family History   Problem Relation Age of Onset   • Lupus Mother    • Kidney Disease Mother    • Stroke Mother    • Kidney Disease Father    • Hypertension Father    • Lupus Sister    • Cancer Paternal Grandfather         liver cancer   • Lupus Sister    • Diabetes Neg Hx      Past Surgical History:   Procedure Laterality Date   • GASTRIC BYPASS LAPAROSCOPIC      2006     Social History     Tobacco Use   • Smoking status: Never Smoker   • Smokeless tobacco: Never Used   Vaping Use   • Vaping Use: Never used   Substance Use Topics   • Alcohol use: Yes     Comment: rare   • Drug use: Never     Social History     Social History Narrative    From [place]    Stay at home    2 cats and 1 dog    Has several craft hobbies    Several tattoos    No blood transfusions    No  work     Current Outpatient Medications Ordered in Epic   Medication Sig Dispense Refill   • ARIPiprazole (ABILIFY) 2 MG tablet Take 1 Tablet by mouth every day. 90 Tablet 3   • pantoprazole (PROTONIX) 40 MG Tablet Delayed Response Take 1  "Tablet by mouth every day. 90 Tablet 3   • gabapentin (NEURONTIN) 100 MG Cap Take 1 Capsule by mouth at bedtime as needed (restless legs). For restless leg syndrome 90 Capsule 3   • lamoTRIgine (LAMICTAL) 25 MG Tab Take 2 Tablets by mouth every day. 180 Tablet 1   • simvastatin (ZOCOR) 40 MG Tab Take 1 Tablet by mouth every evening. 90 Tablet 3   • olmesartan (BENICAR) 40 MG Tab TAKE 1 TABLET BY MOUTH EVERY DAY 90 Tablet 1   • phentermine (ADIPEX-P) 37.5 MG tablet TAKE 1 TABLET (37.5 MG TOTAL) BY MOUTH DAILY BEFORE BREAKFAST. MAX DAILY AMOUNT: 37.5 MG     • sertraline (ZOLOFT) 50 MG Tab TAKE 1 TABLET ORAL DAILY FOR 90 DAYS.     • Multiple Vitamin (MULTI-VITAMIN) Tab Take 1 Tablet by mouth every day.     • Calcium Citrate-Vitamin D 315-250 MG-UNIT Tab Take 1 Tablet by mouth 2 times a day.     • ALPRAZolam (XANAX) 0.5 MG Tab Take 0.5 mg by mouth as needed for Sleep. For anxiety     • finasteride (PROPECIA) 1 MG tablet TAKE 1 TABLET BY MOUTH EVERY DAY 90 Tablet 1     No current Epic-ordered facility-administered medications on file.     Patient has no known allergies.      ROS: see hpi  Gen: no fevers/chills  Pulm: no sob, no cough  CV: no chest pain, no palpitations, no edema  GI: no nausea/vomiting, no diarrhea  Skin: no rash    Objective:   Exam:  /74   Pulse 98   Ht 1.778 m (5' 10\")   Wt (!) 132 kg (290 lb)   BMI 41.61 kg/m²    Body mass index is 41.61 kg/m².    Gen: Alert and oriented, No apparent distress.  HEENT: Head atraumatic, normocephalic. Pupils equal and round.  Neck: Neck is supple without lymphadenopathy.   Lungs: Normal effort, CTA bilaterally, no wheezes, rhonchi, or rales  CV: Regular rate and rhythm. No murmurs, rubs, or gallops.  Ext: No clubbing, cyanosis, edema.    Assessment & Plan:     52 y.o. male with the following -     1. Recurrent major depressive disorder, in full remission (HCC)  Chronic, controlled.  Refill Abilify.  - ARIPiprazole (ABILIFY) 2 MG tablet; Take 1 Tablet by mouth " every day.  Dispense: 90 Tablet; Refill: 3    2. Restless legs syndrome  Chronic, intermittent.  Refill gabapentin.  - gabapentin (NEURONTIN) 100 MG Cap; Take 1 Capsule by mouth at bedtime as needed (restless legs). For restless leg syndrome  Dispense: 90 Capsule; Refill: 3    3. Gastroesophageal reflux disease without esophagitis  Chronic, controlled.  Refill pantoprazole.  - pantoprazole (PROTONIX) 40 MG Tablet Delayed Response; Take 1 Tablet by mouth every day.  Dispense: 90 Tablet; Refill: 3    4. Xerostomia  Chronic, unclear etiology.  Would rule out vitamin deficiency.  Await labs.      Return in about 2 months (around 9/8/2022) for Follow-up.    Roro Munson PA-C (Baker)  Physician Assistant Certified  [facility]    Please note that this dictation was created using voice recognition software. I have made every reasonable attempt to correct obvious errors, but I expect that there are errors of grammar and possibly content that I did not discover before finalizing the note.

## 2022-07-08 NOTE — ASSESSMENT & PLAN NOTE
Chronic, controlled on lamotrigine 50 mg daily, Abilify 2 mg daily, sertraline 50 mg daily.  He states this regimen is working very well for him.  He does need a refill of Abilify.

## 2022-08-22 ENCOUNTER — PATIENT MESSAGE (OUTPATIENT)
Dept: MEDICAL GROUP | Facility: IMAGING CENTER | Age: 52
End: 2022-08-22
Payer: COMMERCIAL

## 2022-08-22 DIAGNOSIS — E78.00 PURE HYPERCHOLESTEROLEMIA: ICD-10-CM

## 2022-08-22 DIAGNOSIS — I10 ESSENTIAL HYPERTENSION: ICD-10-CM

## 2022-08-22 DIAGNOSIS — Z98.84 PERSONAL HX OF GASTRIC BYPASS: ICD-10-CM

## 2022-08-22 DIAGNOSIS — Z13.29 SCREENING FOR THYROID DISORDER: ICD-10-CM

## 2022-08-22 DIAGNOSIS — E79.0 ELEVATED URIC ACID IN BLOOD: ICD-10-CM

## 2022-08-22 DIAGNOSIS — F33.42 RECURRENT MAJOR DEPRESSIVE DISORDER, IN FULL REMISSION (HCC): ICD-10-CM

## 2022-08-22 DIAGNOSIS — Z13.21 ENCOUNTER FOR VITAMIN DEFICIENCY SCREENING: ICD-10-CM

## 2022-08-22 DIAGNOSIS — R73.02 IGT (IMPAIRED GLUCOSE TOLERANCE): ICD-10-CM

## 2022-08-22 DIAGNOSIS — Z13.0 SCREENING FOR DEFICIENCY ANEMIA: ICD-10-CM

## 2022-09-13 DIAGNOSIS — I10 ESSENTIAL HYPERTENSION: ICD-10-CM

## 2022-09-15 RX ORDER — OLMESARTAN MEDOXOMIL 40 MG/1
40 TABLET ORAL DAILY
Qty: 90 TABLET | Refills: 1 | Status: SHIPPED | OUTPATIENT
Start: 2022-09-15 | End: 2023-03-24

## 2022-09-30 ENCOUNTER — HOSPITAL ENCOUNTER (OUTPATIENT)
Dept: LAB | Facility: MEDICAL CENTER | Age: 52
End: 2022-09-30
Attending: PHYSICIAN ASSISTANT
Payer: COMMERCIAL

## 2022-09-30 DIAGNOSIS — E79.0 ELEVATED URIC ACID IN BLOOD: ICD-10-CM

## 2022-09-30 DIAGNOSIS — Z13.0 SCREENING FOR DEFICIENCY ANEMIA: ICD-10-CM

## 2022-09-30 DIAGNOSIS — R73.02 IGT (IMPAIRED GLUCOSE TOLERANCE): ICD-10-CM

## 2022-09-30 DIAGNOSIS — Z13.21 ENCOUNTER FOR VITAMIN DEFICIENCY SCREENING: ICD-10-CM

## 2022-09-30 DIAGNOSIS — Z13.29 SCREENING FOR THYROID DISORDER: ICD-10-CM

## 2022-09-30 DIAGNOSIS — I10 ESSENTIAL HYPERTENSION: ICD-10-CM

## 2022-09-30 DIAGNOSIS — Z98.84 PERSONAL HX OF GASTRIC BYPASS: ICD-10-CM

## 2022-09-30 DIAGNOSIS — E78.00 PURE HYPERCHOLESTEROLEMIA: ICD-10-CM

## 2022-09-30 LAB
25(OH)D3 SERPL-MCNC: 44 NG/ML (ref 30–100)
ALBUMIN SERPL BCP-MCNC: 4.1 G/DL (ref 3.2–4.9)
ALBUMIN/GLOB SERPL: 1.6 G/DL
ALP SERPL-CCNC: 84 U/L (ref 30–99)
ALT SERPL-CCNC: 39 U/L (ref 2–50)
ANION GAP SERPL CALC-SCNC: 10 MMOL/L (ref 7–16)
AST SERPL-CCNC: 24 U/L (ref 12–45)
BASOPHILS # BLD AUTO: 1.2 % (ref 0–1.8)
BASOPHILS # BLD: 0.08 K/UL (ref 0–0.12)
BILIRUB SERPL-MCNC: 0.4 MG/DL (ref 0.1–1.5)
BUN SERPL-MCNC: 14 MG/DL (ref 8–22)
CALCIUM SERPL-MCNC: 9.8 MG/DL (ref 8.5–10.5)
CHLORIDE SERPL-SCNC: 104 MMOL/L (ref 96–112)
CHOLEST SERPL-MCNC: 173 MG/DL (ref 100–199)
CO2 SERPL-SCNC: 24 MMOL/L (ref 20–33)
CREAT SERPL-MCNC: 0.73 MG/DL (ref 0.5–1.4)
EOSINOPHIL # BLD AUTO: 0.26 K/UL (ref 0–0.51)
EOSINOPHIL NFR BLD: 3.8 % (ref 0–6.9)
ERYTHROCYTE [DISTWIDTH] IN BLOOD BY AUTOMATED COUNT: 45.4 FL (ref 35.9–50)
EST. AVERAGE GLUCOSE BLD GHB EST-MCNC: 123 MG/DL
FERRITIN SERPL-MCNC: 98 NG/ML (ref 22–322)
FOLATE SERPL-MCNC: >40 NG/ML
GFR SERPLBLD CREATININE-BSD FMLA CKD-EPI: 108 ML/MIN/1.73 M 2
GLOBULIN SER CALC-MCNC: 2.5 G/DL (ref 1.9–3.5)
GLUCOSE SERPL-MCNC: 108 MG/DL (ref 65–99)
HBA1C MFR BLD: 5.9 % (ref 4–5.6)
HCT VFR BLD AUTO: 45.2 % (ref 42–52)
HDLC SERPL-MCNC: 63 MG/DL
HGB BLD-MCNC: 15.5 G/DL (ref 14–18)
IMM GRANULOCYTES # BLD AUTO: 0.01 K/UL (ref 0–0.11)
IMM GRANULOCYTES NFR BLD AUTO: 0.1 % (ref 0–0.9)
IRON SATN MFR SERPL: 43 % (ref 15–55)
IRON SERPL-MCNC: 137 UG/DL (ref 50–180)
LDLC SERPL CALC-MCNC: 92 MG/DL
LYMPHOCYTES # BLD AUTO: 2.14 K/UL (ref 1–4.8)
LYMPHOCYTES NFR BLD: 31.2 % (ref 22–41)
MCH RBC QN AUTO: 31.7 PG (ref 27–33)
MCHC RBC AUTO-ENTMCNC: 34.3 G/DL (ref 33.7–35.3)
MCV RBC AUTO: 92.4 FL (ref 81.4–97.8)
MONOCYTES # BLD AUTO: 0.53 K/UL (ref 0–0.85)
MONOCYTES NFR BLD AUTO: 7.7 % (ref 0–13.4)
NEUTROPHILS # BLD AUTO: 3.84 K/UL (ref 1.82–7.42)
NEUTROPHILS NFR BLD: 56 % (ref 44–72)
NRBC # BLD AUTO: 0 K/UL
NRBC BLD-RTO: 0 /100 WBC
PLATELET # BLD AUTO: 343 K/UL (ref 164–446)
PMV BLD AUTO: 9.2 FL (ref 9–12.9)
POTASSIUM SERPL-SCNC: 4.3 MMOL/L (ref 3.6–5.5)
PROT SERPL-MCNC: 6.6 G/DL (ref 6–8.2)
RBC # BLD AUTO: 4.89 M/UL (ref 4.7–6.1)
SODIUM SERPL-SCNC: 138 MMOL/L (ref 135–145)
TIBC SERPL-MCNC: 320 UG/DL (ref 250–450)
TRIGL SERPL-MCNC: 92 MG/DL (ref 0–149)
TSH SERPL DL<=0.005 MIU/L-ACNC: 3.09 UIU/ML (ref 0.38–5.33)
UIBC SERPL-MCNC: 183 UG/DL (ref 110–370)
URATE SERPL-MCNC: 6.1 MG/DL (ref 2.5–8.3)
VIT B12 SERPL-MCNC: 3234 PG/ML (ref 211–911)
WBC # BLD AUTO: 6.9 K/UL (ref 4.8–10.8)

## 2022-09-30 PROCEDURE — 80061 LIPID PANEL: CPT

## 2022-09-30 PROCEDURE — 36415 COLL VENOUS BLD VENIPUNCTURE: CPT

## 2022-09-30 PROCEDURE — 82607 VITAMIN B-12: CPT

## 2022-09-30 PROCEDURE — 80053 COMPREHEN METABOLIC PANEL: CPT

## 2022-09-30 PROCEDURE — 83550 IRON BINDING TEST: CPT

## 2022-09-30 PROCEDURE — 83540 ASSAY OF IRON: CPT

## 2022-09-30 PROCEDURE — 82728 ASSAY OF FERRITIN: CPT

## 2022-09-30 PROCEDURE — 84207 ASSAY OF VITAMIN B-6: CPT

## 2022-09-30 PROCEDURE — 83036 HEMOGLOBIN GLYCOSYLATED A1C: CPT

## 2022-09-30 PROCEDURE — 84550 ASSAY OF BLOOD/URIC ACID: CPT

## 2022-09-30 PROCEDURE — 82746 ASSAY OF FOLIC ACID SERUM: CPT

## 2022-09-30 PROCEDURE — 82306 VITAMIN D 25 HYDROXY: CPT

## 2022-09-30 PROCEDURE — 85025 COMPLETE CBC W/AUTO DIFF WBC: CPT

## 2022-09-30 PROCEDURE — 84443 ASSAY THYROID STIM HORMONE: CPT

## 2022-09-30 PROCEDURE — 84425 ASSAY OF VITAMIN B-1: CPT

## 2022-10-04 ENCOUNTER — OFFICE VISIT (OUTPATIENT)
Dept: MEDICAL GROUP | Facility: IMAGING CENTER | Age: 52
End: 2022-10-04
Payer: COMMERCIAL

## 2022-10-04 VITALS
DIASTOLIC BLOOD PRESSURE: 80 MMHG | HEIGHT: 70 IN | TEMPERATURE: 98.1 F | HEART RATE: 96 BPM | OXYGEN SATURATION: 94 % | WEIGHT: 302.6 LBS | SYSTOLIC BLOOD PRESSURE: 114 MMHG | BODY MASS INDEX: 43.32 KG/M2

## 2022-10-04 DIAGNOSIS — E79.0 ELEVATED URIC ACID IN BLOOD: ICD-10-CM

## 2022-10-04 DIAGNOSIS — K11.7 XEROSTOMIA: ICD-10-CM

## 2022-10-04 DIAGNOSIS — E78.00 PURE HYPERCHOLESTEROLEMIA: ICD-10-CM

## 2022-10-04 DIAGNOSIS — E66.01 MORBID OBESITY (HCC): ICD-10-CM

## 2022-10-04 DIAGNOSIS — R73.02 IGT (IMPAIRED GLUCOSE TOLERANCE): ICD-10-CM

## 2022-10-04 PROCEDURE — 99214 OFFICE O/P EST MOD 30 MIN: CPT | Performed by: PHYSICIAN ASSISTANT

## 2022-10-04 RX ORDER — CARBOXYMETHYLCELLULOSE/CITRIC 0.75 G
CAPSULE ORAL
Qty: 168 CAPSULE | Refills: 0 | Status: SHIPPED
Start: 2022-10-04 | End: 2023-05-25

## 2022-10-04 ASSESSMENT — PAIN SCALES - GENERAL: PAINLEVEL: NO PAIN

## 2022-10-04 ASSESSMENT — FIBROSIS 4 INDEX: FIB4 SCORE: 0.59

## 2022-10-04 NOTE — ASSESSMENT & PLAN NOTE
Chronic, uncontrolled.  Patient states that he was on metformin in the past.  He is interested in restarting.  States that he has gained weight as he was traveling recently in [place] and did eat a lot of sweets.

## 2022-10-04 NOTE — PATIENT INSTRUCTIONS
It was a pleasure meeting with you today at [facility]!    Your medical history/records and medications were reviewed today.     Please review my practice information below:    If you have any prescription refill requests, please send them via [employer] or discuss with your provider at the start of your office visits. Please allow 3-5 business days for lab and testing review and you will be contacted via [employer] with those results, or if advised to make a follow up appointment regarding those results, then please do so.     Once resulted, your lab/test/imaging results will show up automatically in your MyChart. Please wait for my interpretation and recommendations prior to viewing your results to avoid any unnecessary confusion or misinterpretation. I will address all of the lab values that I interpret as abnormal and message you accordingly on your MyChart. I will always send you a message about your results even if they are normal. If you do not hear back from me within 5-7 business days after completing your tests, then please send me a message on [employer] so I can obtain your results (especially if you went to an outside lab or imaging center - [facility], [facility], etc).     If you have any additional questions or concerns beyond my interpretation of your results, please make an appointment with me to discuss in further detail.    Please only use the [employer] messaging system for questions regarding your most recent appointment or if advised to use otherwise (glucose or blood pressure reporting).     If you have any new problems or concerns, you must make an appointment to discuss. This includes any referral requests, lab requests (unless advised to notify me for pre-appt labs), medication side effects, or request for medication adjustments.     Please arrive 15 minutes prior to your appointment time to complete your check-in and intake with the medical assistant.      Thank you,    Roro Benítez) Arpit  BRIT  Physician Assistant Certified  [facility]    -----------------------------------------------------------------    Attn: Patients of [facility]:    In an effort to continue to provide excellent and efficient care to our patients, it is vital that we continue to use our resources appropriately. With that, this is a reminder that [employer] is used for prescription refill requests, test results, virtual visits, and chart review only.     Any new questions, concerns/conditions, lab/imaging requests, medication adjustments, new prescriptions, or referral requests do require an appointment (virtually or in person), unless discussed otherwise at your most recent appointment.     Thank you for your understanding,    [facility]

## 2022-10-04 NOTE — PROGRESS NOTES
Subjective:     CC:   Chief Complaint   Patient presents with    Lab Results    Medication Refill     Phentermine maybe refill       HPI:   Chan presents today to discuss:    Elevated uric acid in blood  Most recent labs show improvement of uric acid to 6.1.  Has been feeling well off of hydrochlorothiazide.  No joint pain.    IGT (impaired glucose tolerance)  Chronic, uncontrolled.  Patient states that he was on metformin in the past.  He is interested in restarting.  States that he has gained weight as he was traveling recently in [place] and did eat a lot of sweets.    Morbid obesity (HCC)  Chronic, uncontrolled.  Previously on phentermine but planning on stopping this medication as he feels that it is not as helpful.  History of gastric bypass.  Interested in other weight loss medications.    Xerostomia  Resolved with the addition of vitamin B12.    Hyperlipidemia  Chronic, currently controlled on simvastatin 40 mg nightly.      History reviewed. No pertinent past medical history.  Family History   Problem Relation Age of Onset    Lupus Mother     Kidney Disease Mother     Stroke Mother     Kidney Disease Father     Hypertension Father     Lupus Sister     Cancer Paternal Grandfather         liver cancer    Lupus Sister     Diabetes Neg Hx      Past Surgical History:   Procedure Laterality Date    GASTRIC BYPASS LAPAROSCOPIC      2006     Social History     Tobacco Use    Smoking status: Never    Smokeless tobacco: Never   Vaping Use    Vaping Use: Never used   Substance Use Topics    Alcohol use: Yes     Comment: rare    Drug use: Never     Social History     Social History Narrative    From [place]    Stay at home    2 cats and 1 dog    Has several craft hobbies    Several tattoos    No blood transfusions    No  work     Current Outpatient Medications Ordered in Epic   Medication Sig Dispense Refill    metFORMIN (GLUCOPHAGE) 500 MG Tab Take 1 Tablet by mouth 2 times a day with meals. 60 Tablet 0     "Carboxymeth-Cellulose-CitricAc (PLENITY WELCOME KIT) Cap Take 3 capsules (1 pod) with 16 oz of water 20 minutes before lunch and dinner, 7 days a week. 168 Capsule 0    olmesartan (BENICAR) 40 MG Tab TAKE 1 TABLET BY MOUTH EVERY DAY 90 Tablet 1    finasteride (PROPECIA) 1 MG tablet TAKE ONE TABLET BY MOUTH ONE TIME DAILY 90 Tablet 1    sertraline (ZOLOFT) 50 MG Tab Take 1 Tablet by mouth every day. 90 Tablet 1    ARIPiprazole (ABILIFY) 2 MG tablet Take 1 Tablet by mouth every day. 90 Tablet 3    gabapentin (NEURONTIN) 100 MG Cap Take 1 Capsule by mouth at bedtime as needed (restless legs). For restless leg syndrome 90 Capsule 3    simvastatin (ZOCOR) 40 MG Tab Take 1 Tablet by mouth every evening. 90 Tablet 3    lamoTRIgine (LAMICTAL) 25 MG Tab Take 2 Tablets by mouth every day. 180 Tablet 1    Multiple Vitamin (MULTI-VITAMIN) Tab Take 1 Tablet by mouth every day.      Calcium Citrate-Vitamin D 315-250 MG-UNIT Tab Take 1 Tablet by mouth 2 times a day.      ALPRAZolam (XANAX) 0.5 MG Tab Take 0.5 mg by mouth as needed for Sleep. For anxiety      pantoprazole (PROTONIX) 40 MG Tablet Delayed Response Take 1 Tablet by mouth every day. (Patient not taking: Reported on 10/4/2022) 90 Tablet 3     No current Epic-ordered facility-administered medications on file.     Patient has no known allergies.    Health Maintenance: Completed.  Patient plans to get flu shot next week at the pharmacy.  Due for Shingrix.    ROS: see hpi  Gen: no fevers/chills  Pulm: no sob, no cough  CV: no chest pain, no palpitations, no edema  GI: no nausea/vomiting, no diarrhea  Skin: no rash    Objective:   Exam:  /80   Pulse 96   Temp 36.7 °C (98.1 °F) (Temporal)   Ht 1.778 m (5' 10\") Comment: pt stated  Wt (!) 137 kg (302 lb 9.6 oz)   SpO2 94%   BMI 43.42 kg/m²    Body mass index is 43.42 kg/m².    Gen: Alert and oriented, No apparent distress.  HEENT: Head atraumatic, normocephalic. Pupils equal and round.  Ext: No clubbing, cyanosis, " edema.    Assessment & Plan:     52 y.o. male with the following -     PMH/PSH/FH/Social history reviewed.  Vaccinations discussed.  Previous records and labs reviewed.    1. IGT (impaired glucose tolerance)  Chronic, Uncontrolled.  Add metformin 500 mg twice a day, may start with once daily dosing.  Side effect such as nausea, diarrhea discussed with patient.  Recheck A1c in 3 months.  - metFORMIN (GLUCOPHAGE) 500 MG Tab; Take 1 Tablet by mouth 2 times a day with meals.  Dispense: 60 Tablet; Refill: 0    2. Morbid obesity (HCC)  Chronic, uncontrolled.  We will discontinue phentermine at this time and trial Plenity, prescription sent to mail order pharmacy.  Patient would like to start medication until he has been on metformin for 1 month to avoid increased risk of GI side effects.  Patient will notify me if he does develop any side effects on this medication such as diarrhea, nausea, increased flatulence.  - Carboxymeth-Cellulose-CitricAc (PLENITY WELCOME KIT) Cap; Take 3 capsules (1 pod) with 16 oz of water 20 minutes before lunch and dinner, 7 days a week.  Dispense: 168 Capsule; Refill: 0    3. Pure hypercholesterolemia  Chronic, controlled and stable. Continue current regimen of Zocor 40 mg nightly.    4. Elevated uric acid in blood  Chronic, improved off of hydrochlorothiazide.    5. Xerostomia  Resolved    6. BMI 40.0-44.9, adult (HCC)  Chronic, uncontrolled.  See #2.      Return in about 3 months (around 1/4/2023) for Follow-up, Medication recheck.    Roro Munson PA-C (Baker)  Physician Assistant Certified  [facility]    Please note that this dictation was created using voice recognition software. I have made every reasonable attempt to correct obvious errors, but I expect that there are errors of grammar and possibly content that I did not discover before finalizing the note.

## 2022-10-04 NOTE — ASSESSMENT & PLAN NOTE
Most recent labs show improvement of uric acid to 6.1.  Has been feeling well off of hydrochlorothiazide.  No joint pain.

## 2022-10-04 NOTE — ASSESSMENT & PLAN NOTE
Chronic, uncontrolled.  Previously on phentermine but planning on stopping this medication as he feels that it is not as helpful.  History of gastric bypass.  Interested in other weight loss medications.

## 2022-10-07 LAB — VIT B6 SERPL-MCNC: 113.9 NMOL/L (ref 20–125)

## 2022-10-08 LAB — VIT B1 BLD-MCNC: 149 NMOL/L (ref 70–180)

## 2022-10-15 DIAGNOSIS — F33.41 RECURRENT MAJOR DEPRESSIVE DISORDER, IN PARTIAL REMISSION (HCC): ICD-10-CM

## 2022-10-18 RX ORDER — LAMOTRIGINE 25 MG/1
50 TABLET ORAL DAILY
Qty: 180 TABLET | Refills: 1 | Status: SHIPPED | OUTPATIENT
Start: 2022-10-18 | End: 2023-05-25 | Stop reason: SDUPTHER

## 2022-12-22 DIAGNOSIS — F33.42 RECURRENT MAJOR DEPRESSIVE DISORDER, IN FULL REMISSION (HCC): ICD-10-CM

## 2023-02-25 DIAGNOSIS — L65.9 HAIR LOSS: ICD-10-CM

## 2023-02-27 RX ORDER — FINASTERIDE 1 MG/1
TABLET, FILM COATED ORAL
Qty: 90 TABLET | Refills: 0 | Status: SHIPPED | OUTPATIENT
Start: 2023-02-27 | End: 2023-05-25 | Stop reason: SDUPTHER

## 2023-03-24 DIAGNOSIS — I10 ESSENTIAL HYPERTENSION: ICD-10-CM

## 2023-03-24 RX ORDER — OLMESARTAN MEDOXOMIL 40 MG/1
40 TABLET ORAL DAILY
Qty: 90 TABLET | Refills: 1 | Status: SHIPPED | OUTPATIENT
Start: 2023-03-24 | End: 2023-05-25 | Stop reason: SDUPTHER

## 2023-05-23 ENCOUNTER — HOSPITAL ENCOUNTER (OUTPATIENT)
Dept: LAB | Facility: MEDICAL CENTER | Age: 53
End: 2023-05-23
Attending: PHYSICIAN ASSISTANT
Payer: COMMERCIAL

## 2023-05-23 DIAGNOSIS — E79.0 ELEVATED URIC ACID IN BLOOD: ICD-10-CM

## 2023-05-23 PROCEDURE — 84550 ASSAY OF BLOOD/URIC ACID: CPT

## 2023-05-23 PROCEDURE — 36415 COLL VENOUS BLD VENIPUNCTURE: CPT

## 2023-05-24 DIAGNOSIS — L65.9 HAIR LOSS: ICD-10-CM

## 2023-05-24 LAB — URATE SERPL-MCNC: 6.8 MG/DL (ref 2.5–8.3)

## 2023-05-25 ENCOUNTER — OFFICE VISIT (OUTPATIENT)
Dept: MEDICAL GROUP | Facility: IMAGING CENTER | Age: 53
End: 2023-05-25
Payer: COMMERCIAL

## 2023-05-25 VITALS
BODY MASS INDEX: 43.46 KG/M2 | OXYGEN SATURATION: 96 % | TEMPERATURE: 97.1 F | WEIGHT: 303.6 LBS | HEART RATE: 93 BPM | RESPIRATION RATE: 17 BRPM | HEIGHT: 70 IN | DIASTOLIC BLOOD PRESSURE: 80 MMHG | SYSTOLIC BLOOD PRESSURE: 110 MMHG

## 2023-05-25 DIAGNOSIS — L65.9 HAIR LOSS: ICD-10-CM

## 2023-05-25 DIAGNOSIS — K21.9 GASTROESOPHAGEAL REFLUX DISEASE WITHOUT ESOPHAGITIS: ICD-10-CM

## 2023-05-25 DIAGNOSIS — E78.00 PURE HYPERCHOLESTEROLEMIA: ICD-10-CM

## 2023-05-25 DIAGNOSIS — F33.42 RECURRENT MAJOR DEPRESSIVE DISORDER, IN FULL REMISSION (HCC): ICD-10-CM

## 2023-05-25 DIAGNOSIS — I10 ESSENTIAL HYPERTENSION: ICD-10-CM

## 2023-05-25 DIAGNOSIS — K11.7 XEROSTOMIA: ICD-10-CM

## 2023-05-25 DIAGNOSIS — G25.81 RESTLESS LEGS SYNDROME: ICD-10-CM

## 2023-05-25 DIAGNOSIS — E79.0 ELEVATED URIC ACID IN BLOOD: ICD-10-CM

## 2023-05-25 PROCEDURE — 99214 OFFICE O/P EST MOD 30 MIN: CPT | Performed by: PHYSICIAN ASSISTANT

## 2023-05-25 PROCEDURE — 1126F AMNT PAIN NOTED NONE PRSNT: CPT | Performed by: PHYSICIAN ASSISTANT

## 2023-05-25 PROCEDURE — 3074F SYST BP LT 130 MM HG: CPT | Performed by: PHYSICIAN ASSISTANT

## 2023-05-25 PROCEDURE — 3079F DIAST BP 80-89 MM HG: CPT | Performed by: PHYSICIAN ASSISTANT

## 2023-05-25 RX ORDER — FINASTERIDE 1 MG/1
TABLET, FILM COATED ORAL
Qty: 90 TABLET | Refills: 0 | Status: SHIPPED | OUTPATIENT
Start: 2023-05-25 | End: 2023-08-24

## 2023-05-25 RX ORDER — OLMESARTAN MEDOXOMIL 40 MG/1
40 TABLET ORAL DAILY
Qty: 90 TABLET | Refills: 1 | Status: SHIPPED | OUTPATIENT
Start: 2023-05-25 | End: 2023-11-17

## 2023-05-25 RX ORDER — ARIPIPRAZOLE 2 MG/1
2 TABLET ORAL DAILY
Qty: 90 TABLET | Refills: 3 | Status: SHIPPED | OUTPATIENT
Start: 2023-05-25

## 2023-05-25 RX ORDER — GABAPENTIN 300 MG/1
300 CAPSULE ORAL NIGHTLY PRN
Qty: 90 CAPSULE | Refills: 3 | Status: SHIPPED | OUTPATIENT
Start: 2023-05-25

## 2023-05-25 RX ORDER — PANTOPRAZOLE SODIUM 40 MG/1
40 TABLET, DELAYED RELEASE ORAL DAILY
Qty: 90 TABLET | Refills: 3 | Status: SHIPPED | OUTPATIENT
Start: 2023-05-25

## 2023-05-25 RX ORDER — LAMOTRIGINE 25 MG/1
50 TABLET ORAL DAILY
Qty: 180 TABLET | Refills: 1 | Status: SHIPPED | OUTPATIENT
Start: 2023-05-25 | End: 2023-11-17

## 2023-05-25 RX ORDER — FINASTERIDE 1 MG/1
1 TABLET, FILM COATED ORAL DAILY
Qty: 90 TABLET | Refills: 3 | Status: SHIPPED | OUTPATIENT
Start: 2023-05-25

## 2023-05-25 RX ORDER — SIMVASTATIN 40 MG
40 TABLET ORAL NIGHTLY
Qty: 90 TABLET | Refills: 3 | Status: SHIPPED | OUTPATIENT
Start: 2023-05-25

## 2023-05-25 ASSESSMENT — PAIN SCALES - GENERAL: PAINLEVEL: NO PAIN

## 2023-05-25 ASSESSMENT — FIBROSIS 4 INDEX: FIB4 SCORE: 0.61

## 2023-05-25 ASSESSMENT — PATIENT HEALTH QUESTIONNAIRE - PHQ9: CLINICAL INTERPRETATION OF PHQ2 SCORE: 0

## 2023-05-25 NOTE — ASSESSMENT & PLAN NOTE
Patient with scalp alopecia, has been on Propecia 1 mg daily with improvement of symptoms.  Does note some mild increase in breast tissue since being on this medication, but does not want to change the dosing quite yet.

## 2023-05-25 NOTE — PATIENT INSTRUCTIONS
It was a pleasure meeting with you today at [facility]!    Your medical history/records and medications were reviewed today.     UPDATE on MyChart Results: If you have blood work, and/or imaging studies, or any other test or procedure completed, you will have access to results as soon as they become available in MyChart. Recently, these results will be available for review at the same time that your provider is able to see results!    This will likely mean you will see a result before your provider has had a chance to review and discuss with you.  Some results or care notes may be hard to understand and may be serious in nature.    We look at every result and your provider will contact you to explain what they mean and discuss appropriate next steps. Please allow for at least 72 business hours for chart and result review.     We prefer that you wait for your care team to contact you with your results.  Often, your provider will discuss your results with you at your next appointment. We look forward to continuing to partner with you in your care.    Please review my practice information below:    If you have any prescription refill requests, please send them via [employer] or discuss with your provider at the start of your office visits. Please allow 3-5 business days for lab and testing review and you will be contacted via [employer] with those results, or if advised to make a follow up appointment regarding those results, then please do so.     Once resulted, your lab/test/imaging results will show up automatically in your MyChart. Please wait for my interpretation and recommendations prior to viewing your results to avoid any unnecessary confusion or misinterpretation. I will address all of the lab values that I interpret as abnormal and message you accordingly on your MyChart. I will always send you a message about your results even if they are normal. If you do not hear back from me within 5-7 business  days after completing your tests, then please send me a message on [employer] so I can obtain your results (especially if you went to an outside lab or imaging center - [facility], [facility], etc).     If you have any additional questions or concerns beyond my interpretation of your results, please make an appointment with me to discuss in further detail.    Please only use the [employer] messaging system for questions regarding your most recent appointment or if advised to use otherwise (glucose or blood pressure reporting).     If you have any new problems or concerns, you must make an appointment to discuss. This includes any referral requests, lab requests (unless advised to notify me for pre-appt labs), medication side effects, or request for medication adjustments.     Please arrive 15 minutes prior to your appointment time to complete your check-in and intake with the medical assistant.      Thank you,    Roro Munson PA-C (Baker)  Physician Assistant Certified  [facility]    -----------------------------------------------------------------    Attn: Patients of [facility]:    In an effort to continue to provide excellent and efficient care to our patients, it is vital that we continue to use our resources appropriately. With that, this is a reminder that [employer] is used for prescription refill requests, test results, virtual visits, and chart review only.     Any new questions, concerns/conditions, lab/imaging requests, medication adjustments, new prescriptions, or referral requests do require an appointment (virtually or in person), unless discussed otherwise at your most recent appointment.     Thank you for your understanding,    [facility]

## 2023-05-25 NOTE — PROGRESS NOTES
Subjective:     CC:   Chief Complaint   Patient presents with    Lab Results    Referral Needed     ENT        HPI:   Chan presents today to discuss:    Depression  Chronic, controlled and stable on lamotrigine 50 mg daily, Abilify 2 mg daily, and sertraline 50 mg daily.  Needs refills today.    Elevated uric acid in blood  A1c increased to 6.8.  No gout symptoms.  States that he did drink alcohol within 24 hours of the lab draw.    Essential hypertension  Chronic, controlled stable on olmesartan 40 mg daily.    Gastroesophageal reflux disease  Chronic, controlled stable on pantoprazole 40 mg daily.    Hair loss  Patient with scalp alopecia, has been on Propecia 1 mg daily with improvement of symptoms.  Does note some mild increase in breast tissue since being on this medication, but does not want to change the dosing quite yet.    Hyperlipidemia  Chronic, controlled stable on simvastatin 40 mg nightly.  Needs refill.    Restless legs syndrome  Chronic, controlled stable.  Patient states that he has been on gabapentin 300 mg nightly, previous prescription was only for the 100 mg dose.  Requesting updated dosing.    Xerostomia  Patient admits to chronic dry mouth, as well as chronic dry nasal passages.  Would like a new referral to ENT, as his previous .  Sjogren's testing negative.      History reviewed. No pertinent past medical history.  Family History   Problem Relation Age of Onset    Lupus Mother     Kidney Disease Mother     Stroke Mother     Kidney Disease Father     Hypertension Father     Lupus Sister     Cancer Paternal Grandfather         liver cancer    Lupus Sister     Diabetes Neg Hx      Past Surgical History:   Procedure Laterality Date    GASTRIC BYPASS LAPAROSCOPIC           Social History     Tobacco Use    Smoking status: Never    Smokeless tobacco: Never   Vaping Use    Vaping Use: Never used   Substance Use Topics    Alcohol use: Yes     Comment: rare    Drug use: Never     Social  History     Social History Narrative    From [place]    Stay at home    2 cats and 1 dog    Has several craft hobbies    Several tattoos    No blood transfusions    No  work     Current Outpatient Medications Ordered in Epic   Medication Sig Dispense Refill    gabapentin (NEURONTIN) 300 MG Cap Take 1 Capsule by mouth at bedtime as needed (restless legs). For restless leg syndrome 90 Capsule 3    finasteride (PROPECIA) 1 MG tablet Take 1 Tablet by mouth every day. 90 Tablet 3    simvastatin (ZOCOR) 40 MG Tab Take 1 Tablet by mouth every evening. 90 Tablet 3    sertraline (ZOLOFT) 50 MG Tab Take 1 Tablet by mouth every day. 90 Tablet 1    pantoprazole (PROTONIX) 40 MG Tablet Delayed Response Take 1 Tablet by mouth every day. 90 Tablet 3    olmesartan (BENICAR) 40 MG Tab Take 1 Tablet by mouth every day. 90 Tablet 1    lamoTRIgine (LAMICTAL) 25 MG Tab Take 2 Tablets by mouth every day. 180 Tablet 1    ARIPiprazole (ABILIFY) 2 MG tablet Take 1 Tablet by mouth every day. 90 Tablet 3    metFORMIN (GLUCOPHAGE) 500 MG Tab TAKE 1 TABLET BY MOUTH TWICE A DAY WITH MEALS 180 Tablet 1    Multiple Vitamin (MULTI-VITAMIN) Tab Take 1 Tablet by mouth every day.      Calcium Citrate-Vitamin D 315-250 MG-UNIT Tab Take 1 Tablet by mouth 2 times a day.      ALPRAZolam (XANAX) 0.5 MG Tab Take 0.5 mg by mouth as needed for Sleep. For anxiety       No current [facility]-ordered facility-administered medications on file.     Patient has no known allergies.    PMH/PSH/FH/Social history reviewed.  Vaccinations discussed.  Previous records and labs reviewed. Discussed age appropriate anticipatory guidance.    ROS: see hpi  Gen: no fevers/chills  Pulm: no sob, no cough  CV: no chest pain, no palpitations, no edema  GI: no nausea/vomiting, no diarrhea  Skin: no rash    Objective:   Exam:  /80 (BP Location: Left arm, Patient Position: Sitting, BP Cuff Size: Adult)   Pulse 93   Temp 36.2 °C (97.1 °F) (Temporal)   Resp 17   Ht 1.778  "m (5' 10\")   Wt (!) 138 kg (303 lb 9.6 oz)   SpO2 96%   BMI 43.56 kg/m²    Body mass index is 43.56 kg/m².    Gen: Alert and oriented, No apparent distress.  HEENT: Head atraumatic, normocephalic. Pupils equal and round.  Neck: Neck is supple without lymphadenopathy.   Lungs: Normal effort, CTA bilaterally, no wheezes, rhonchi, or rales  CV: Regular rate and rhythm. No murmurs, rubs, or gallops.  Ext: No clubbing, cyanosis, edema.    Assessment & Plan:     53 y.o. male with the following -     1. Xerostomia  - Referral to ENT    2. Elevated uric acid in blood  Continue to monitor, limit alcohol use.  Follow-up if you develop any joint pain or symptoms of gout.  Low purine diet.    3. Gastroesophageal reflux disease without esophagitis  Chronic, controlled and stable. Continue current regimen -   - pantoprazole (PROTONIX) 40 MG Tablet Delayed Response; Take 1 Tablet by mouth every day.  Dispense: 90 Tablet; Refill: 3    4. Hair loss  Chronic, controlled and stable. Continue current regimen, but if gynecomastia persists or worsens, then will stop the medication.  - finasteride (PROPECIA) 1 MG tablet; Take 1 Tablet by mouth every day.  Dispense: 90 Tablet; Refill: 3    5. Restless legs syndrome  Chronic, controlled and stable. Continue current regimen -dose updated.  - gabapentin (NEURONTIN) 300 MG Cap; Take 1 Capsule by mouth at bedtime as needed (restless legs). For restless leg syndrome  Dispense: 90 Capsule; Refill: 3    6. Pure hypercholesterolemia  Chronic, controlled and stable. Continue current regimen -   - simvastatin (ZOCOR) 40 MG Tab; Take 1 Tablet by mouth every evening.  Dispense: 90 Tablet; Refill: 3    7. Recurrent major depressive disorder, in full remission (HCC)  Chronic, controlled and stable. Continue current regimen -   - sertraline (ZOLOFT) 50 MG Tab; Take 1 Tablet by mouth every day.  Dispense: 90 Tablet; Refill: 1  - lamoTRIgine (LAMICTAL) 25 MG Tab; Take 2 Tablets by mouth every day.  " Dispense: 180 Tablet; Refill: 1  - ARIPiprazole (ABILIFY) 2 MG tablet; Take 1 Tablet by mouth every day.  Dispense: 90 Tablet; Refill: 3    8. Essential hypertension  Chronic, controlled and stable. Continue current regimen -   - olmesartan (BENICAR) 40 MG Tab; Take 1 Tablet by mouth every day.  Dispense: 90 Tablet; Refill: 1  Recommend DASH diet, exercise as tolerated. Monitor Blood Pressure at home and report any consistent readings above >140/90. Seek medical help/ER if chest pain, palpitations, shortness of breath, headache, dizziness.       Return in about 6 months (around 11/25/2023) for Annual physical.    Roro Munson PA-C ([place])  Physician Assistant Certified  [facility]    Please note that this dictation was created using voice recognition software. I have made every reasonable attempt to correct obvious errors, but I expect that there are errors of grammar and possibly content that I did not discover before finalizing the note.

## 2023-05-25 NOTE — ASSESSMENT & PLAN NOTE
Chronic, controlled stable.  Patient states that he has been on gabapentin 300 mg nightly, previous prescription was only for the 100 mg dose.  Requesting updated dosing.

## 2023-05-25 NOTE — ASSESSMENT & PLAN NOTE
Chronic, controlled and stable on lamotrigine 50 mg daily, Abilify 2 mg daily, and sertraline 50 mg daily.  Needs refills today.

## 2023-05-25 NOTE — ASSESSMENT & PLAN NOTE
A1c increased to 6.8.  No gout symptoms.  States that he did drink alcohol within 24 hours of the lab draw.

## 2023-05-25 NOTE — ASSESSMENT & PLAN NOTE
Patient admits to chronic dry mouth, as well as chronic dry nasal passages.  Would like a new referral to ENT, as his previous .  Sjogren's testing negative.

## 2023-10-17 DIAGNOSIS — R73.02 IGT (IMPAIRED GLUCOSE TOLERANCE): ICD-10-CM

## 2023-11-17 DIAGNOSIS — I10 ESSENTIAL HYPERTENSION: ICD-10-CM

## 2023-11-17 DIAGNOSIS — F33.42 RECURRENT MAJOR DEPRESSIVE DISORDER, IN FULL REMISSION (HCC): ICD-10-CM

## 2023-11-17 RX ORDER — LAMOTRIGINE 25 MG/1
50 TABLET ORAL DAILY
Qty: 180 TABLET | Refills: 1 | Status: SHIPPED | OUTPATIENT
Start: 2023-11-17

## 2023-11-17 RX ORDER — OLMESARTAN MEDOXOMIL 40 MG/1
40 TABLET ORAL DAILY
Qty: 90 TABLET | Refills: 1 | Status: SHIPPED | OUTPATIENT
Start: 2023-11-17

## 2024-02-22 DIAGNOSIS — F33.42 RECURRENT MAJOR DEPRESSIVE DISORDER, IN FULL REMISSION (HCC): ICD-10-CM

## 2024-02-22 NOTE — TELEPHONE ENCOUNTER
Received request via: Pharmacy    Was the patient seen in the last year in this department? Yes    Does the patient have an active prescription (recently filled or refills available) for medication(s) requested? No    Pharmacy Name: [facility]     Does the patient have [facility] and need 100 day supply (blood pressure, diabetes and cholesterol meds only)? Patient does not have SCP

## 2024-04-25 DIAGNOSIS — E78.00 PURE HYPERCHOLESTEROLEMIA: ICD-10-CM

## 2024-04-25 RX ORDER — SIMVASTATIN 40 MG
40 TABLET ORAL EVERY EVENING
Qty: 90 TABLET | Refills: 0 | Status: SHIPPED | OUTPATIENT
Start: 2024-04-25

## 2024-04-25 NOTE — TELEPHONE ENCOUNTER
Received request via: Pharmacy    Was the patient seen in the last year in this department? Yes    Does the patient have an active prescription (recently filled or refills available) for medication(s) requested? No    Ph[facility] Name[facility]     Does the patient have [facility] and need 100 day supply (blood pressure, diabetes and cholesterol meds only)? Patient does not have SCP

## 2024-05-28 DIAGNOSIS — I10 ESSENTIAL HYPERTENSION: ICD-10-CM

## 2024-05-28 RX ORDER — OLMESARTAN MEDOXOMIL 40 MG/1
40 TABLET ORAL DAILY
Qty: 30 TABLET | Refills: 0 | Status: SHIPPED | OUTPATIENT
Start: 2024-05-28

## 2024-05-28 NOTE — TELEPHONE ENCOUNTER
Received request via: Pharmacy    Was the patient seen in the last year in this department? Yes    Does the patient have an active prescription (recently filled or refills available) for medication(s) requested? No    Pharmacy Name: [facility]     Does the patient have [facility] and need 100 day supply (blood pressure, diabetes and cholesterol meds only)? Patient does not have SCP

## 2024-06-19 DIAGNOSIS — I10 ESSENTIAL HYPERTENSION: ICD-10-CM

## 2024-06-20 RX ORDER — OLMESARTAN MEDOXOMIL 40 MG/1
40 TABLET ORAL DAILY
Qty: 90 TABLET | Refills: 0 | Status: SHIPPED | OUTPATIENT
Start: 2024-06-20